# Patient Record
Sex: MALE | Employment: STUDENT | URBAN - METROPOLITAN AREA
[De-identification: names, ages, dates, MRNs, and addresses within clinical notes are randomized per-mention and may not be internally consistent; named-entity substitution may affect disease eponyms.]

---

## 2017-01-05 ENCOUNTER — TELEPHONE (OUTPATIENT)
Dept: INTERNAL MEDICINE CLINIC | Facility: CLINIC | Age: 22
End: 2017-01-05

## 2017-01-05 NOTE — TELEPHONE ENCOUNTER
Police called on call service last night and I returned call. Patient was found dead in his room. No cause was able to be disclosed. Police were wondering if I would sign the death certificate, but before I could return the call, the ME's office took control of the body. Called pt's mother, Jacques Olson, this morning and left a message asking her to return my call if she needs anything.